# Patient Record
(demographics unavailable — no encounter records)

---

## 2017-11-03 NOTE — CT
CTA OF THE HEAD AND NECK UTILIZING IV CONTRAST AND 3D REFORMATTED IMAGING:

 

Date:  11/03/17 

 

HISTORY:  

Right-sided Abraham syndrome. 

 

TECHNIQUE:  

Multiple CTA images were obtained of the head and neck utilizing IV contrast and 3D reformatted imagi
ng. 

 

FINDINGS:

The visualized lung apices are clear. The visualized upper mediastinum appears within normal limits. 


 

The aortic arch and great vessels have a normal origin. The right common carotid artery is widely pat
ent. The right internal carotid artery is widely patent. 

 

The left common carotid artery and cervical left internal carotid artery appear widely patent. There 
are vascular calcifications involving the cavernous segments of the carotid arteries bilaterally. No 
definite hemodynamically significant stenosis evident. Visualized aspects of the ACAs, MCAs, and PCAs
 are widely patent. The right PCA has a fetal origin. The basilar artery is patent. The vertebral art
eries are widely patent. The left vertebral proximal segment is highly tortuous. 

 

There is scattered degenerative and osteoarthritic change of the visualized skeletal structures. The 
prevertebral soft tissues appear within normal limits. No lymphadenopathy is evident. The visualized 
aerodigestive tract appears within normal limits. No area of abnormal enhancement is seen involving t
he brain parenchyma. 

 

IMPRESSION: 

1.  No hemodynamically significant stenosis demonstrated. 

2.  No lung apical mass or mediastinal lymphadenopathy demonstrated. 

3.  No lymphadenopathy or soft tissue mass of the neck demonstrated. 

4.  Fetal origin of the right PCA. 

5.  Other chronic findings as above. 

 

 

POS: [facility]

## 2017-11-03 NOTE — MRI
MRI BRAIN NONCONTRAST:

 

History: Right sided Abraham syndrome. 

 

FINDINGS: 

There is no evidence of acute intracranial hemorrhage or infarct. Diffuse cortical atrophy and mild 
chronic ischemic small vessel disease is apparent. Dystrophic calcification is associated with each 
basal ganglia. There is no mass effect or shift of midline structures. Visualized paranasal sinuses 
remain well aerated. 

 

IMPRESSION: 

No acute intracranial abnormalities are demonstrated. 

 

POS: [facility]

## 2017-11-03 NOTE — HP
DATE OF ADMISSION:  2017

 

PRIMARY CARE PHYSICIAN:  Moises Mitchell M.D.

 

PRIMARY CARDIOLOGIST:  Shawn Marcum M.D.

 

CHIEF COMPLAINT:  Double vision with right eye ptosis.

 

CODE STATUS:  FULL CODE.

 

SURROGATE DECISION MAKER:  The patient makes his own decision with the help of his family.

 

HISTORY OF PRESENT ILLNESS:  The patient is an 80-year-old male with coronary artery disease and hyp
ertension, who presented to the emergency room with above complaints.

 

Approximately one month ago, the patient developed sudden onset of right eye drooping along with raheem
ble vision.  It started the next day after the flu shot.  His symptoms progressively got worse.  He 
denied any headache, swallowing difficulty, speech difficulty, weakness, numbness of any of his extr
emities or sensory disturbances.  He was evaluated by Ophthalmology 2 weeks ago and was started on c
orticosteroid ointment with some improvement in the eyelid swelling.  However, his double vision did
 not improve.  Today, he was seen by Dr. Gleason, who advised him to go to the emergency room for ev
aluation.

 

In the emergency room, his initial vital signs showed temperature of 97.3, respirations 18, pulse of
 80, blood pressure 124/78 with O2 saturation 95% on room air.  CTA of the head and neck was negativ
e.  He also had an MRI of the brain that was negative for acute CVA.  He received Aggrenox in the em
ergency room for suspected CVA.

 

PAST MEDICAL HISTORY:

1.  Coronary artery disease status post CABG with stent placement followed by Dr. Marcum.

2.  Hypertension.

3.  Hyperlipidemia.

4.  Gout.

 

PAST SURGICAL HISTORY:

1.  Cardiac catheterization.

2.  Coronary artery bypass grafting.

 

ALLERGIES:  No known drug allergies.

 

CURRENT HOME MEDICATIONS:  Aspirin 81 mg daily, allopurinol 300 mg at bedtime, amitriptyline 10 mg a
t bedtime, aspirin 81 mg daily, Lipitor 20 mg daily, Coreg 3.125 mg b.i.d., vitamin D3 1000 mg at be
dtime, Nexium 40 mg daily, fish oil 350 mg daily, and Lisinopril 2.5 mg daily.

 

SOCIAL HISTORY:  The patient currently lives at home with his family.  No history of smoking, alcoho
l or drug use.

 

FAMILY HISTORY:  Mother  of bone cancer at the age of 88.

 

REVIEW OF SYSTEMS:  The following complete review of systems was negative, unless otherwise mentione
d in the HPI or below:

Constitutional:  Weight loss or gain, ability to conduct usual activities.

Skin:  Rash, itching.

Eyes:  Double vision, pain.

ENT/Mouth:  Nose bleeding, neck stiffness, pain, tenderness.

Cardiovascular:  Palpitations, dyspnea on exertion, orthopnea.

Respiratory:  Shortness of breath, wheezing, cough, hemoptysis, fever or night sweats.

Gastrointestinal:  Poor appetite, abdominal pain, heartburn, nausea, vomiting, constipation, or diar
coreen.

Genitourinary:  Urgency, frequency, dysuria, nocturia.

Musculoskeletal:  Pain, swelling.

Neurologic/Psychiatric:  Anxiety, depression.

Allergy/Immunologic:  Skin rash, bleeding tendency.

 

PHYSICAL EXAMINATION:

VITAL SIGNS:  As discussed above.

GENERAL:  An 80-year-old male in no significant distress.

HEENT:  Head, atraumatic and normocephalic.  Right eye ptosis, pupils are slightly dilated.  Good re
sponse to light.  His findings are consistent with third nerve palsy.  He was unable to adduction.  
He had double vision when trying to focus.  Moist mucous membranes.  No oral lesion.

NECK:  Supple, no JVD, no carotid bruit.

LUNGS:  Clear to auscultation bilaterally.

HEART:  S1, S2 present.  Regular rate and rhythm.  No rubs or gallops.  Healed midline scar from pre
vious CABG.

ABDOMEN:  Soft, nontender, bowel sounds present.

EXTREMITIES:  No edema or calf tenderness.

NEUROLOGIC:  The cranial nerves were normal on examination.  Power was 5/5 in all extremities.  Fing
er-to-nose and heel-to-shin test was normal.  Reflexes were equivocal.  Sensation to touch was allison
l bilaterally.

SKIN:  Warm and dry.

LYMPH NODES:  No palpable lymph nodes in the neck.

PSYCHIATRY:  Alert, awake, and oriented x3.

PERIPHERAL VASCULAR:  Radial pulses palpable bilaterally.

MUSCULOSKELETAL:  No joint swelling or tenderness.

 

LABORATORY AND X-RAY FINDINGS:  ESR and CRP in normal range.  Cardiac enzymes are normal.  BUN 16 an
d creatinine 1.1.  LFTs and electrolytes in normal range.  CBC showed WBC 8.5 with hemoglobin 16.9 a
nd hematocrit 50.5.

 

Telemetry monitoring by my review showed sinus rhythm.

 

IMPRESSION:

1.  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

2.  Coronary artery disease, status post coronary artery bypass graft with stent placement.

3.  Hypertension.

4.  Dyslipidemia.

5.  Chronic kidney disease stage 2.

6.  Gout.

 

PLAN:

1.  The patient will be monitored in the stroke unit.  Ophthalmology will be consulted.  I discussed
 the case with Ophthalmology, Dr. Marshall.  He recommended 60 mg prednisone on a daily basis.  His s
ymptoms are going on for approximately 1 month.  I will also discuss with Neurology, Dr. Rios who re
commended outpatient followup.  The patient recently had an echocardiogram with Dr. Marcum.  We will
 continue to monitor on the telemetry monitor.  We will continue aspirin for now.

2.  Disposition probably in a.m. after ophthalmology evaluation.

3.  Plan of care was discussed with the patient and the family at the bedside.  They stated understa
nding.

## 2017-11-04 NOTE — DIS
DATE OF DISCHARGE:  11/04/2017

 

DISCHARGE DISPOSITION:  Home.

 

FOLLOWUP:  Follow up with Dr. Marcum in 1 week.  The patient also follows Dr. Mitchell.  Follow up with Dr Marshall and Dr Rios.

 

INPATIENT CONSULTANT:  Ophthalmology, Dr. Marshall.

 

ALLERGIES:  No known drug allergies.

 

The patient was seen and examined on the day of discharge, denies any new 
complaints.  No new focal neurologic deficits.

 

BRIEF HOSPITAL COURSE:  The patient is an 80-year-old male with coronary artery 
disease, status post CABG and hypertension, presented to the hospital with 
double vision with right eye ptosis of 1 month duration.  The patient was sent 
to the emergency room by Dr. Gleason (Ophthalmologist).  Please refer to the 
history and physical for further details.

 

The patient was admitted to the hospital with the diagnosis of suspected acute 
CVA.  MRI of the brain was negative.  CTA of the head and neck was 
hemodynamically significant for stenosis or aneurysm.  The right PCA was fetal 
in origin.  The case was discussed extensively with Ophthalmology, Dr. Marshall 
as well as Neurology, Dr. Kya Rios.  The patient will continue 81 mg aspirin.
  He probably has ischemic right third nerve palsy.  He was started on 
steroids.  The patient was evaluated by Dr. Marshall (Ophthalmology). He 
recommended to increase Aspirin to 325 mg daily. 

 

FINAL DIAGNOSES:

1.  Right third nerve palsy, suspected secondary to ischemia.

2.  Coronary artery disease, status post coronary artery bypass graft and stent 
placement.

3.  Hypertension.

4.  Dyslipidemia.

5.  Chronic kidney disease stage 2.

6.  Gout.

 

DISCHARGE MEDICATIONS:  Same as admission medications:

1.  Allopurinol 300 mg at bedtime.

2.  Amitriptyline 10 mg at bedtime. 

3.  Aspirin 325 mg daily. (dose increased)

4.  Lipitor 20 mg at bedtime.

5.  Coreg 3.125 mg b.i.d.

6.  Vitamin D3 1000 units daily.

7.  Nexium 40 mg daily.

8.  Fish oil 350 mg daily.

9.  Lisinopril 2.5 mg daily.

 

The patient will be discharged later today after cleared by Ophthalmology.

 

[facility]

## 2019-07-22 NOTE — RAD
LUMBAR SPINE 2 VIEWS:

 

Date:  07/22/19 

 

HISTORY:  

Low back pain since Thursday. History of chronic back pain. 

 

FINDINGS:

Severe multilevel disc osteophytosis with mild levoscoliosis. Grade I/II anterolisthesis of L5 on S1.
 Stent material overlying the right upper abdomen, probably indwelling biliary stent. 

 

IMPRESSION: 

Severe spondylosis. Mild levoscoliosis. Chronic appearing anterolisthesis of L5 on S1. Other findings
 as above. 

 

 

POS: [facility]

## 2019-07-30 NOTE — CT
CT Abdomen Pelvis W WO con



History: Malignant neoplasm ampulla



Comparison: None.



Findings: Given lack of comparison examination, treatment response is unable to be performed.



Subtle nodule right lung base abutting the diaphragm measuring 5 mm. No pericardial effusion.



Extensive biliary gas with intrahepatic and extrahepatic biliary dilatation. There is reflux of enter
ic contrast in the distal common bile duct with abnormal hyperenhancement suggesting cholangitis.

There appears be debris within the distal common bile duct stent. Mild dilatation of the pancreatic d
uct. No definite pancreatic mass is appreciated.



No abnormal nancy hepatis adenopathy. Portal vein is patent.



Spleen is intact. No nephrolithiasis. No hydronephrosis. Aortic contour is nonaneurysmal.



No acute osseous abnormality. Bilateral L5 pars interarticularis defects with grade 1 anterolisthesis
.



Moderate diverticular disease of the sigmoid colon without active current inflammation.



There appears be a large posterior bladder diverticulum measuring approximately 3.9 x 5 x 5 cm.



Impression: 

1. High-grade intrahepatic and extrahepatic biliary dilatation concerning for stent malfunction. Ther
e is reflux of contrast within the distal common bile duct likely retrograde from the stent and

less likely due to erosion and fistulization from the second portion of duodenum.

2. Abnormal hyperenhancement distal common bile duct suggesting cholangitis.

3. Mild dilatation of the pancreatic duct without definite mass appreciated.

4. No metastatic nancy hepatis adenopathy.

5. Large posterior bladder diverticulum.

6. 5 mm nodule right lung base abutting the diaphragm. Recommend correlation with prior imaging.

7. Subtle round 11 mm hepatic segment 6 mass only seen on the delayed phase of contrast may be sequel
ae of biliary dilatation and regenerative nodule below metastasis cannot be totally excluded.



Reported By: Alex Betts 

Electronically Signed:  7/30/2019 11:07 AM

## 2019-10-08 NOTE — CT
CT chest with IV contrast

CT abdomen and pelvis with IV contrast



HISTORY: Pancreatic cancer. Restaging.



COMPARISON: 7/30/2019.



FINDINGS: Mild atelectasis at each lung bases. The tiny noncalcified nodule at the lateral aspect of 
the right lung base abutting the diaphragmatic pleura is stable. No new nodules.

No pneumothorax or mediastinal adenopathy.. Reflux of contrast into the dilated esophagus.



Biliary stent remains in place with distention of the biliary system and pneumobilia similar in appea
billie to the prior exam. Borderline distention of the pancreatic duct is stable. No mass is visible

at the ampulla.



Within the anterior segment right liver lobe, an ill-defined, subtle low density mass now measures up
 to 1.4 cm greatest diameter. It is significantly more conspicuous than on previous exams.



On axial image 42, there is a subtle area of decreased density at the central portion of the liver ne
ar the inferior vena cava which could represent an additional low density liver mass or mixing of

contrast and blood within the central portion of the hepatic veins. Nonenlarged lymph nodes posterior
 to the aorta at the level of the left hemidiaphragm posteriorly is stable. Nonenlarged lymph nodes

within the retroperitoneum are also stable.



Calcification throughout the arterial structures with mild fusiform ectasia of the lower abdominal ao
rta. Large diverticulum projecting right posterolateral eighth from the urinary bladder is stable.

Bilateral pars interarticularis defects are apparent at the lumbosacral junction with grade 1 spondyl
olisthesis. Prominent degenerative changes throughout the lumbar spine.











IMPRESSION: Enlarging hypoechoic mass within the posterior segment right liver lobe. Favored to be a 
metastatic lesion.



Subtle low-density area near the central upper liver adjacent to the inferior vena cava could represe
nt an additional low density mass (or could be related to contrast/blood mixing within the hepatic

veins)



Biliary stent, pneumobilia, and biliary dilatation are stable.



Tiny nodule at the right lung base is stable.



Gastroesophageal reflux.



Atherosclerosis



Reported By: CELIA Schmitz 

Electronically Signed:  10/8/2019 12:00 PM

## 2020-01-06 NOTE — CT
CT OF CHEST AND ABDOMEN AND PELVIS PERFORMED WITH IV CONTRAST ENHANCEMENT:

 

Date:  01/06/2020

 

HISTORY:  

Pancreatic cancer. Follow-up for evaluation of disease progression. History of chemotherapy and radia
tion, cholecystectomy, and biliary status placement. 

 

COMPARISON:  

10/08/2019 study. 

 

FINDINGS:

 

CT CHEST:

The lungs are clear of any infiltrative process. Small pleural based area of nodularity in the right 
lung base, axial image 35, is stable. 

 

The thoracic aorta is normal in caliber. Postop sternotomy changes are seen. No significant mediastin
al or hilar adenopathy. Once again, within the apex of the left ventricle, is an area of low attenuat
ion density and calcifications that may represent a small focus of thrombus. 

 

CT ABDOMEN PERFORMED WITH CONTRAST ENHANCEMENT:

Pneumobilia is again noted within the liver. The degree of dilatation of the biliary tree is similar 
to the prior examination. The biliary stent remains in position. There has been a progression of the 
liver disease. The  lesion within the more posterior aspect of the right lobe of the liver that  jesus
ured 1.4 cm in AP dimension on the prior exam now measures 2.7 cm. The lesion which is just directly 
adjacent to the IVC is stable in appearance. It measures approximately 1.7 cm. There are now some sma
ll low attenuation lesions seen in the dome of the liver on axial image 41 measuring in the 1.0 cm ra
nge, not definitely visualized on the prior exam. Also, there is now an ill-defined area of low atten
uation within the right lobe measuring 2.3 cm, axial image 45. Other vague hypodense areas within the
 more inferior aspect of the right lobe were not definitively visualized on the prior study. 

 

The spleen is unremarkable in appearance. Pancreatic duct remains prominent. These findings are stabl
e as compared to the prior examination. Prominence to the pancreatic head is unchanged. 

 

Right and left adrenal glands, and right and left kidneys are normal in appearance. No significant pe
riaortic or mesenteric adenopathy. Colonic diverticulosis is noted. 

 

CT PELVIS PERFORMED WITH CONTRAST ENHANCEMENT:

No evidence of adenopathy, mass, or free fluid. 

 

There is a cystic appearing structure within the pelvis, which I think probably represents a divertic
ulum from the bladder. It is unchanged in appearance. Prostate is mildly prominent. 

 

IMPRESSION: 

1.  Progression of liver disease as described above. 

 

2.  Persistent pneumobilia with a biliary stent in place. Gallbladder has also been removed. 

 

3.  Tiny pleural based nodularity along the right hemidiaphragm, stable. 

 

4.  Area of what may be scarring or small thrombus in the apex of the left ventricle, unchanged. 

 

5.  Colonic diverticulosis. 

 

 

POS: [facility]